# Patient Record
Sex: MALE | Race: WHITE | NOT HISPANIC OR LATINO | URBAN - METROPOLITAN AREA
[De-identification: names, ages, dates, MRNs, and addresses within clinical notes are randomized per-mention and may not be internally consistent; named-entity substitution may affect disease eponyms.]

---

## 2023-10-16 ENCOUNTER — OFFICE VISIT (OUTPATIENT)
Dept: URBAN - METROPOLITAN AREA CLINIC 35 | Facility: CLINIC | Age: 73
End: 2023-10-16

## 2023-10-16 NOTE — HPI-COLORECTAL CANCER SCREENING
72 y/o male patient presents today for a colorectal cancer screening. Patient admits/denies this will be his first colonoscopy. He admits/denies a family history of colon, gastric, or esophageal cancer/polyps. Currently reports - - bowel movements - - with/out strain. His stools are - - with/out blood, mucus, or melena. He admits/denies any episodes of rectal pain or pruritus ani.

## 2024-01-19 ENCOUNTER — OFFICE VISIT (OUTPATIENT)
Dept: URBAN - METROPOLITAN AREA CLINIC 35 | Facility: CLINIC | Age: 74
End: 2024-01-19
Payer: MEDICARE

## 2024-01-19 VITALS
DIASTOLIC BLOOD PRESSURE: 78 MMHG | WEIGHT: 226 LBS | BODY MASS INDEX: 34.25 KG/M2 | HEIGHT: 68 IN | SYSTOLIC BLOOD PRESSURE: 140 MMHG

## 2024-01-19 DIAGNOSIS — Z12.12 ENCOUNTER FOR SCREENING FOR MALIGNANT NEOPLASM OF RECTUM: ICD-10-CM

## 2024-01-19 DIAGNOSIS — K22.70 BARRETT'S ESOPHAGUS WITHOUT DYSPLASIA: ICD-10-CM

## 2024-01-19 DIAGNOSIS — Z12.11 ENCOUNTER FOR SCREENING FOR MALIGNANT NEOPLASM OF COLON: ICD-10-CM

## 2024-01-19 PROBLEM — 302914006: Status: ACTIVE | Noted: 2024-01-19

## 2024-01-19 PROCEDURE — 99203 OFFICE O/P NEW LOW 30 MIN: CPT | Performed by: PHYSICIAN ASSISTANT

## 2024-01-19 RX ORDER — SODIUM, POTASSIUM,MAG SULFATES 17.5-3.13G
AS DIRECTED SOLUTION, RECONSTITUTED, ORAL ORAL
Qty: 1 | Refills: 0 | OUTPATIENT
Start: 2024-01-19 | End: 2024-01-21

## 2024-01-19 RX ORDER — DULAGLUTIDE 1.5 MG/.5ML
AS DIRECTED INJECTION, SOLUTION SUBCUTANEOUS
Status: ACTIVE | COMMUNITY

## 2024-01-19 RX ORDER — METHOCARBAMOL 750 MG/1
1 TABLET TABLET ORAL
Status: ACTIVE | COMMUNITY

## 2024-01-19 RX ORDER — METOPROLOL SUCCINATE 25 MG/1
1 TABLET TABLET, FILM COATED, EXTENDED RELEASE ORAL ONCE A DAY
Status: ACTIVE | COMMUNITY

## 2024-01-19 RX ORDER — EZETIMIBE 10 MG/1
1 TABLET TABLET ORAL ONCE A DAY
Status: ACTIVE | COMMUNITY

## 2024-01-19 RX ORDER — IBUPROFEN 800 MG/1
1 TABLET WITH FOOD OR MILK AS NEEDED TABLET, FILM COATED ORAL
Status: ACTIVE | COMMUNITY

## 2024-01-19 NOTE — HPI-COLORECTAL CANCER SCREENING
72 y/o male patient presents today for a colorectal cancer screening. Patient denies this will be his first colonoscopy. His last one was over 10 years ago edith small polyp removed, 2010. He denies a family history of colon, gastric, or esophageal cancer/polyps. Currently reports 1-2 bowel movements per day without strain. His stools are formed without blood, mucus, or melena. He denies any episodes of rectal pain or pruritus ani.

## 2024-01-19 NOTE — HPI-BARRETT'S ESOPHAGUS
Patient denies dyspepsia, dysphagia, excessive belching, globus, sour eructations, bloating/gas, early satiety, changes in appetite, coughing, abdominal/epigastric pain, or changes in bowel habits. Patient admits past EGD in 2013 which was performed by Dr. Anne with findings NEGATIVE for Ruiz's esophagus and a hiatal hernia. He is on Omeprazole 40mg daily with no reflux symptoms as long as he takes it daily.
no
Clindamycin Counseling: I counseled the patient regarding use of clindamycin as an antibiotic for prophylactic and/or therapeutic purposes. Clindamycin is active against numerous classes of bacteria, including skin bacteria. Side effects may include nausea, diarrhea, gastrointestinal upset, rash, hives, yeast infections, and in rare cases, colitis.

## 2024-01-24 ENCOUNTER — OUT OF OFFICE VISIT (OUTPATIENT)
Dept: URBAN - METROPOLITAN AREA SURGERY CENTER 8 | Facility: SURGERY CENTER | Age: 74
End: 2024-01-24
Payer: MEDICARE

## 2024-01-24 ENCOUNTER — CLAIMS CREATED FROM THE CLAIM WINDOW (OUTPATIENT)
Dept: URBAN - METROPOLITAN AREA CLINIC 4 | Facility: CLINIC | Age: 74
End: 2024-01-24
Payer: MEDICARE

## 2024-01-24 DIAGNOSIS — D12.6 TUBULAR ADENOMA OF COLON: ICD-10-CM

## 2024-01-24 DIAGNOSIS — D12.3 ADENOMA OF TRANSVERSE COLON: ICD-10-CM

## 2024-01-24 DIAGNOSIS — K22.89 DILATATION OF ESOPHAGUS: ICD-10-CM

## 2024-01-24 DIAGNOSIS — K31.89 ACHYLIA: ICD-10-CM

## 2024-01-24 DIAGNOSIS — D12.4 ADENOMA OF DESCENDING COLON: ICD-10-CM

## 2024-01-24 DIAGNOSIS — Z12.11 COLON CANCER SCREENING: ICD-10-CM

## 2024-01-24 DIAGNOSIS — K31.7 GASTRIC POLYPS: ICD-10-CM

## 2024-01-24 DIAGNOSIS — R68.89 ERYTHEMATOUS MUCOSA: ICD-10-CM

## 2024-01-24 DIAGNOSIS — T47.8X5A ADVERSE EFFECT OF OTHER AGENTS PRIMARILY AFFECTING GASTROINTESTINAL SYSTEM, INITIAL ENCOUNTER: ICD-10-CM

## 2024-01-24 PROCEDURE — 88305 TISSUE EXAM BY PATHOLOGIST: CPT | Performed by: PATHOLOGY

## 2024-01-24 PROCEDURE — 88312 SPECIAL STAINS GROUP 1: CPT | Performed by: PATHOLOGY

## 2024-01-24 PROCEDURE — 00813 ANES UPR LWR GI NDSC PX: CPT

## 2024-01-24 PROCEDURE — 45385 COLONOSCOPY W/LESION REMOVAL: CPT | Performed by: INTERNAL MEDICINE

## 2024-01-24 PROCEDURE — 43239 EGD BIOPSY SINGLE/MULTIPLE: CPT | Performed by: INTERNAL MEDICINE

## 2024-01-24 PROCEDURE — G8907 PT DOC NO EVENTS ON DISCHARG: HCPCS | Performed by: INTERNAL MEDICINE

## 2024-01-24 RX ORDER — IBUPROFEN 800 MG/1
1 TABLET WITH FOOD OR MILK AS NEEDED TABLET, FILM COATED ORAL
Status: ACTIVE | COMMUNITY

## 2024-01-24 RX ORDER — DULAGLUTIDE 1.5 MG/.5ML
AS DIRECTED INJECTION, SOLUTION SUBCUTANEOUS
Status: ACTIVE | COMMUNITY

## 2024-01-24 RX ORDER — METHOCARBAMOL 750 MG/1
1 TABLET TABLET ORAL
Status: ACTIVE | COMMUNITY

## 2024-01-24 RX ORDER — METOPROLOL SUCCINATE 25 MG/1
1 TABLET TABLET, FILM COATED, EXTENDED RELEASE ORAL ONCE A DAY
Status: ACTIVE | COMMUNITY

## 2024-01-24 RX ORDER — EZETIMIBE 10 MG/1
1 TABLET TABLET ORAL ONCE A DAY
Status: ACTIVE | COMMUNITY

## 2024-02-15 ENCOUNTER — OV EP (OUTPATIENT)
Dept: URBAN - METROPOLITAN AREA CLINIC 35 | Facility: CLINIC | Age: 74
End: 2024-02-15

## 2024-02-15 NOTE — HPI-BARRETT'S ESOPHAGUS
73 year old male patient presents today for a follow up from his EGD. He admits/denies any complications post procedure.   EGD report shows: - Normal hypopharynx. - 2 cm hiatal hernia. - Z-line irregular, 38 cm from the incisors. - Normal mucosa was found in the distal esophagus.  Biopsied. - Erythematous mucosa in the stomach.  Biopsied. - Multiple gastric polyps. - Normal examined duodenum. Stomach, Antrum and Body, Biopsy: PROTON PUMP INHIBITOR EFFECT. Esophagus, Lower-Third, Biopsy: SQUAMOUS MUCOSA WITHOUT SIGNIFICANT ABNORMALITY; NO COLUMNAR              MUCOSA IDENTIFIED.  Last visit: Patient denies dyspepsia, dysphagia, excessive belching, globus, sour eructations, bloating/gas, early satiety, changes in appetite, coughing, abdominal/epigastric pain, or changes in bowel habits. Patient admits past EGD in 2013 which was performed by Dr. Anne with findings NEGATIVE for Ruiz's esophagus and a hiatal hernia. He is on Omeprazole 40mg daily with no reflux symptoms as long as he takes it daily.

## 2024-02-15 NOTE — HPI-COLONOSCOPY FOLLOWUP
Currently admits -- bowel movements per --. Stools are --. Admits/Denies any blood in stools.  Collonoscopy report shows: - Internal hemorrhoids. - One 4 mm tubular adenoma in the distal transverse colon. - One 6 mm tubular adenoma in the proximal descending colon.

## 2024-02-27 ENCOUNTER — OV EP (OUTPATIENT)
Dept: URBAN - METROPOLITAN AREA CLINIC 35 | Facility: CLINIC | Age: 74
End: 2024-02-27

## 2024-03-14 ENCOUNTER — OV EP (OUTPATIENT)
Dept: URBAN - METROPOLITAN AREA CLINIC 35 | Facility: CLINIC | Age: 74
End: 2024-03-14
Payer: MEDICARE

## 2024-03-14 VITALS
SYSTOLIC BLOOD PRESSURE: 124 MMHG | BODY MASS INDEX: 33.04 KG/M2 | DIASTOLIC BLOOD PRESSURE: 76 MMHG | HEIGHT: 68 IN | WEIGHT: 218 LBS

## 2024-03-14 DIAGNOSIS — K64.8 HEMORRHOIDS, INTERNAL: ICD-10-CM

## 2024-03-14 DIAGNOSIS — K31.7 GASTRIC POLYPS: ICD-10-CM

## 2024-03-14 DIAGNOSIS — K22.70 BARRETT'S ESOPHAGUS WITHOUT DYSPLASIA: ICD-10-CM

## 2024-03-14 DIAGNOSIS — K44.9 HIATAL HERNIA: ICD-10-CM

## 2024-03-14 PROBLEM — 78809005: Status: ACTIVE | Noted: 2024-03-14

## 2024-03-14 PROCEDURE — 99213 OFFICE O/P EST LOW 20 MIN: CPT | Performed by: PHYSICIAN ASSISTANT

## 2024-03-14 RX ORDER — BIOTIN 5 MG
AS DIRECTED TABLET ORAL
Status: ACTIVE | COMMUNITY

## 2024-03-14 RX ORDER — EZETIMIBE 10 MG/1
1 TABLET TABLET ORAL ONCE A DAY
Status: ACTIVE | COMMUNITY

## 2024-03-14 RX ORDER — METHOCARBAMOL 750 MG/1
1 TABLET TABLET ORAL
Status: ACTIVE | COMMUNITY

## 2024-03-14 RX ORDER — METOPROLOL SUCCINATE 25 MG/1
1 TABLET TABLET, FILM COATED, EXTENDED RELEASE ORAL ONCE A DAY
Status: ACTIVE | COMMUNITY

## 2024-03-14 RX ORDER — IBUPROFEN 800 MG/1
1 TABLET WITH FOOD OR MILK AS NEEDED TABLET, FILM COATED ORAL
Status: ACTIVE | COMMUNITY

## 2024-03-14 RX ORDER — DULAGLUTIDE 1.5 MG/.5ML
AS DIRECTED INJECTION, SOLUTION SUBCUTANEOUS
Status: ACTIVE | COMMUNITY

## 2024-03-14 NOTE — HPI-BARRETT'S ESOPHAGUS
73 year old male patient presents today for a follow up from his EGD. He denies any complications post procedure. He is on Omepreazole 40mg daily with good control of symptoms.  EGD report shows: - Normal hypopharynx. - 2 cm hiatal hernia. - Z-line irregular, 38 cm from the incisors. - Normal mucosa was found in the distal esophagus.  Biopsied. - Erythematous mucosa in the stomach.  Biopsied. - Multiple gastric polyps. - Normal examined duodenum. Stomach, Antrum and Body, Biopsy: PROTON PUMP INHIBITOR EFFECT. Esophagus, Lower-Third, Biopsy: SQUAMOUS MUCOSA WITHOUT SIGNIFICANT ABNORMALITY; NO COLUMNAR              MUCOSA IDENTIFIED.  Last visit: Patient denies dyspepsia, dysphagia, excessive belching, globus, sour eructations, bloating/gas, early satiety, changes in appetite, coughing, abdominal/epigastric pain, or changes in bowel habits. Patient admits past EGD in 2013 which was performed by Dr. Anne with findings NEGATIVE for Ruiz's esophagus and a hiatal hernia. He is on Omeprazole 40mg daily with no reflux symptoms as long as he takes it daily.

## 2024-03-14 NOTE — HPI-COLONOSCOPY FOLLOWUP
Currently reports 1-2 bowel movements per day without strain. His stools are formed without blood, mucus, or melena. He denies any episodes of rectal pain or pruritus ani. He admits he takes Kratum which constipates him and he now takes a laxative daily to regulate bowel movements.   Collonoscopy report shows: - Internal hemorrhoids. - One 4 mm tubular adenoma in the distal transverse colon. - One 6 mm tubular adenoma in the proximal descending colon.

## 2025-04-09 ENCOUNTER — APPOINTMENT (OUTPATIENT)
Dept: URBAN - NONMETROPOLITAN AREA OTHER 1 | Facility: OTHER | Age: 75
Setting detail: DERMATOLOGY
End: 2025-04-09

## 2025-04-09 DIAGNOSIS — Z71.89 OTHER SPECIFIED COUNSELING: ICD-10-CM

## 2025-04-09 DIAGNOSIS — L82.1 OTHER SEBORRHEIC KERATOSIS: ICD-10-CM

## 2025-04-09 DIAGNOSIS — L81.4 OTHER MELANIN HYPERPIGMENTATION: ICD-10-CM

## 2025-04-09 PROCEDURE — ? COUNSELING

## 2025-04-09 PROCEDURE — 99203 OFFICE O/P NEW LOW 30 MIN: CPT

## 2025-04-09 ASSESSMENT — LOCATION SIMPLE DESCRIPTION DERM
LOCATION SIMPLE: RIGHT CHEEK
LOCATION SIMPLE: RIGHT EYEBROW
LOCATION SIMPLE: ANTERIOR SCALP
LOCATION SIMPLE: LEFT SCALP

## 2025-04-09 ASSESSMENT — LOCATION ZONE DERM
LOCATION ZONE: SCALP
LOCATION ZONE: FACE

## 2025-04-09 ASSESSMENT — LOCATION DETAILED DESCRIPTION DERM
LOCATION DETAILED: RIGHT SUPERIOR CENTRAL MALAR CHEEK
LOCATION DETAILED: MID-FRONTAL SCALP
LOCATION DETAILED: LEFT CENTRAL FRONTAL SCALP
LOCATION DETAILED: RIGHT LATERAL EYEBROW